# Patient Record
Sex: MALE | Race: WHITE | NOT HISPANIC OR LATINO | Employment: FULL TIME | URBAN - METROPOLITAN AREA
[De-identification: names, ages, dates, MRNs, and addresses within clinical notes are randomized per-mention and may not be internally consistent; named-entity substitution may affect disease eponyms.]

---

## 2023-06-05 ENCOUNTER — APPOINTMENT (OUTPATIENT)
Dept: GENERAL RADIOLOGY | Facility: HOSPITAL | Age: 53
End: 2023-06-05
Payer: OTHER MISCELLANEOUS

## 2023-06-05 ENCOUNTER — HOSPITAL ENCOUNTER (EMERGENCY)
Facility: HOSPITAL | Age: 53
Discharge: HOME | End: 2023-06-05
Attending: EMERGENCY MEDICINE | Admitting: EMERGENCY MEDICINE
Payer: OTHER MISCELLANEOUS

## 2023-06-05 VITALS
HEART RATE: 91 BPM | SYSTOLIC BLOOD PRESSURE: 137 MMHG | RESPIRATION RATE: 16 BRPM | TEMPERATURE: 98 F | OXYGEN SATURATION: 98 % | DIASTOLIC BLOOD PRESSURE: 84 MMHG

## 2023-06-05 DIAGNOSIS — S62.664A CLOSED NONDISPLACED FRACTURE OF DISTAL PHALANX OF RIGHT RING FINGER, INITIAL ENCOUNTER: Primary | ICD-10-CM

## 2023-06-05 DIAGNOSIS — S60.041A CONTUSION OF RIGHT RING FINGER WITHOUT DAMAGE TO NAIL, INITIAL ENCOUNTER: ICD-10-CM

## 2023-06-05 DIAGNOSIS — S61.219A SUPERFICIAL LACERATION OF FINGER: ICD-10-CM

## 2023-06-05 PROCEDURE — 90715 TDAP VACCINE 7 YRS/> IM: CPT | Performed by: EMERGENCY MEDICINE

## 2023-06-05 PROCEDURE — 99283 EMERGENCY DEPT VISIT LOW MDM: CPT

## 2023-06-05 PROCEDURE — 73130 X-RAY EXAM OF HAND: CPT

## 2023-06-05 PROCEDURE — 25010000002 TETANUS-DIPHTH-ACELL PERTUSSIS 5-2.5-18.5 LF-MCG/0.5 SUSPENSION PREFILLED SYRINGE: Performed by: EMERGENCY MEDICINE

## 2023-06-05 PROCEDURE — 63710000001 ONDANSETRON ODT 4 MG TABLET DISPERSIBLE: Performed by: EMERGENCY MEDICINE

## 2023-06-05 PROCEDURE — 90471 IMMUNIZATION ADMIN: CPT | Performed by: EMERGENCY MEDICINE

## 2023-06-05 RX ORDER — CEPHALEXIN 500 MG/1
500 CAPSULE ORAL ONCE
Status: COMPLETED | OUTPATIENT
Start: 2023-06-05 | End: 2023-06-05

## 2023-06-05 RX ORDER — CEPHALEXIN 500 MG/1
500 CAPSULE ORAL 3 TIMES DAILY
Qty: 15 CAPSULE | Refills: 0 | Status: SHIPPED | OUTPATIENT
Start: 2023-06-05

## 2023-06-05 RX ORDER — ONDANSETRON 4 MG/1
4 TABLET, ORALLY DISINTEGRATING ORAL ONCE
Status: COMPLETED | OUTPATIENT
Start: 2023-06-05 | End: 2023-06-05

## 2023-06-05 RX ORDER — TRAMADOL HYDROCHLORIDE 50 MG/1
50 TABLET ORAL EVERY 6 HOURS PRN
Qty: 10 TABLET | Refills: 0 | Status: SHIPPED | OUTPATIENT
Start: 2023-06-05

## 2023-06-05 RX ORDER — HYDROCODONE BITARTRATE AND ACETAMINOPHEN 7.5; 325 MG/1; MG/1
1 TABLET ORAL ONCE
Status: COMPLETED | OUTPATIENT
Start: 2023-06-05 | End: 2023-06-05

## 2023-06-05 RX ADMIN — TETANUS TOXOID, REDUCED DIPHTHERIA TOXOID AND ACELLULAR PERTUSSIS VACCINE, ADSORBED 0.5 ML: 5; 2.5; 8; 8; 2.5 SUSPENSION INTRAMUSCULAR at 09:57

## 2023-06-05 RX ADMIN — CEPHALEXIN 500 MG: 500 CAPSULE ORAL at 10:44

## 2023-06-05 RX ADMIN — HYDROCODONE BITARTRATE AND ACETAMINOPHEN 1 TABLET: 7.5; 325 TABLET ORAL at 10:03

## 2023-06-05 RX ADMIN — ONDANSETRON 4 MG: 4 TABLET, ORALLY DISINTEGRATING ORAL at 10:03

## 2023-06-05 NOTE — DISCHARGE INSTRUCTIONS
Follow-up with Dr. Brown (hand surgeon).  Wear splint on finger for the next 1 week.  Remove the bandage in 24 hours.  After that, clean the wounds with warm soapy water twice daily.  Cover wounds with Band-Aids.  Take medications as prescribed.  Return to the emergency department for fever, chills, finger redness, worsening pain, or other concern.

## 2023-06-05 NOTE — ED PROVIDER NOTES
EMERGENCY DEPARTMENT ENCOUNTER    Room Number:  12/12  Date seen:  6/5/2023  PCP: Provider, No Known  Historian: Patient, daughter (acting as )      HPI:  Chief Complaint: Right fourth and fifth finger injury  A complete HPI/ROS/PMH/PSH/SH/FH are unobtainable due to: Nothing  Context: Nicola Malloy is a 52 y.o. male who presents to the ED by private vehicle from work c/o right fourth and fifth finger injuries.  Patient was at work this morning when his fingers were smashed in a piece of equipment.  He reports tingling and decreased movement in the distal aspect of the right fourth and fifth fingers.  He is left-handed.  He is unsure of his tetanus status.  Denies other injuries.            PAST MEDICAL HISTORY  Active Ambulatory Problems     Diagnosis Date Noted    No Active Ambulatory Problems     Resolved Ambulatory Problems     Diagnosis Date Noted    No Resolved Ambulatory Problems     No Additional Past Medical History         PAST SURGICAL HISTORY  History reviewed. No pertinent surgical history.      FAMILY HISTORY  History reviewed. No pertinent family history.      SOCIAL HISTORY  Social History     Socioeconomic History    Marital status: Single         ALLERGIES  Patient has no known allergies.        REVIEW OF SYSTEMS  Review of Systems     All systems have been reviewed and are negative except as as discussed in the HPI    PHYSICAL EXAM  ED Triage Vitals   Temp Heart Rate Resp BP SpO2   06/05/23 0903 06/05/23 0903 06/05/23 0903 06/05/23 0935 06/05/23 0903   98 °F (36.7 °C) 87 18 137/84 100 %      Temp src Heart Rate Source Patient Position BP Location FiO2 (%)   06/05/23 0903 06/05/23 0903 -- -- --   Tympanic Monitor          Physical Exam      GENERAL: Awake and alert.  Well-developed, well-nourished male.  Resting comfortably in no acute distress  HENT: NCAT, nares patent  EYES: no scleral icterus  CV: regular rhythm, normal rate, normal right radial pulse  RESPIRATORY: normal effort,  clear to auscultation bilaterally  ABDOMEN: Soft, nontender  MUSCULOSKELETAL: There is tenderness, swelling, and bruising of the distal aspect of the right fourth finger.  There is minimal tenderness at the tip of the right fifth finger.  There is a small proximal subungual hematoma of the right fourth finger.  There is a superficial laceration on the palmar and distal aspect of the right fourth finger.  Laceration is less than 0.5 cm in length.  There is a superficial abrasion at the base of the nail of the right fourth finger.  Nail is intact and without laxity.  There is full range of motion of the left fourth and fifth MCP, PIP, and DIP joints.  NEURO: Normal strength and light touch sensation in the right upper extremity  PSYCH:  calm, cooperative  SKIN: warm, dry    Vital signs and nursing notes reviewed.          LAB RESULTS  No results found for this or any previous visit (from the past 24 hour(s)).    Ordered the above labs and reviewed the results.        RADIOLOGY  XR Hand 3+ View Right    Result Date: 6/5/2023  XR HAND 3+ VW RIGHT-  INDICATIONS: Trauma  TECHNIQUE: 4 views of the right hand  COMPARISON: None available  FINDINGS:  A fracture is seen at the tip of the fourth distal phalanx with adjacent soft tissue swelling. No other evidence of fracture is identified. No dislocation.       Fracture of the fourth distal phalanx.  This report was finalized on 6/5/2023 10:07 AM by Dr. Hi Vasquez M.D.       Ordered the above noted radiological studies. Reviewed by me in PACS.            PROCEDURES  Procedures              MEDICATIONS GIVEN IN ER  Medications   Tetanus-Diphth-Acell Pertussis (BOOSTRIX) injection 0.5 mL (0.5 mL Intramuscular Given 6/5/23 0957)   HYDROcodone-acetaminophen (NORCO) 7.5-325 MG per tablet 1 tablet (1 tablet Oral Given 6/5/23 1003)   ondansetron ODT (ZOFRAN-ODT) disintegrating tablet 4 mg (4 mg Oral Given 6/5/23 1003)   cephalexin (KEFLEX) capsule 500 mg (500 mg Oral Given  6/5/23 1044)                   MEDICAL DECISION MAKING, PROGRESS, and CONSULTS    All labs have been independently reviewed by me.  All radiology studies have been reviewed by me and I have also reviewed the radiology report.   EKG's independently viewed and interpreted by me.  Discussion below represents my analysis of pertinent findings related to patient's condition, differential diagnosis, treatment plan and final disposition.      Additional sources:  - Discussed/ obtained information from independent historians: Daughter at bedside    - External (non-ED) record review: There are no prior records in the EMR    - Chronic or social conditions impacting care: N/A          Orders placed during this visit:  Orders Placed This Encounter   Procedures    XR Hand 3+ View Right         Additional orders considered but not ordered:  N/A        Differential diagnosis:    Finger fracture/dislocation, finger contusion, laceration, nail injury      Independent interpretation of labs, radiology studies, and discussions with consultants:  ED Course as of 06/05/23 1512   Mon Jun 05, 2023   0959 Hand x-ray personally interpreted by me.  My personal interpretation is: There is a fracture of the tip of the distal fourth phalanx.  No dislocation.  No foreign bodies. [WH]   1033 X-rays were shown to the patient and his daughter.  Results were discussed with him.  Patient's finger has been cleaned.  The right fourth finger will be placed in a dressing and splint.  He will be discharged with prescriptions for short course of Keflex and pain medication.  He will be referred to hand surgery for follow-up. [WH]   1040 TEOFILO query complete. Treatment plan to include limited course of prescribed  controlled substance. Risks including addiction, benefits, and alternatives presented to patient.    [WH]      ED Course User Index  [WH] Gurjit Blake MD               DIAGNOSIS  Final diagnoses:   Closed nondisplaced fracture of distal  phalanx of right ring finger, initial encounter   Superficial laceration of finger   Contusion of right ring finger without damage to nail, initial encounter         DISPOSITION  DISCHARGE    Patient discharged in stable condition.    Reviewed implications of results, diagnosis, meds, responsibility to follow up, warning signs and symptoms of possible worsening, potential complications and reasons to return to ER, including worsening pain, drainage from wound, fever, or other concern..    Patient/Family voiced understanding of above instructions.    Discussed plan for discharge, as there is no emergent indication for admission. Patient referred to primary care provider for BP management due to today's BP. Pt/family is agreeable and understands need for follow up and repeat testing.  Pt is aware that discharge does not mean that nothing is wrong but it indicates no emergency is present that requires admission and they must continue care with follow-up as given below or physician of their choice.     FOLLOW-UP  Franco Brown MD  [address] [facility] [address]  [place] [id number] [phone]    Schedule an appointment as soon as possible for a visit   Fracture of distal phalanx of the right fourth finger         Medication List        New Prescriptions      cephalexin 500 MG capsule  Commonly known as: KEFLEX  Take 1 capsule by mouth 3 (Three) Times a Day.     traMADol 50 MG tablet  Commonly known as: ULTRAM  Take 1 tablet by mouth Every 6 (Six) Hours As Needed for Moderate Pain.               Where to Get Your Medications        These medications were sent to [facility] - [place], [place] - [address] - [phone]  - [phone] [address], [facility] [address]      Phone: [phone]   cephalexin 500 MG capsule  traMADol 50 MG tablet                   Latest Documented Vital Signs:  As of 15:12 EDT  BP- 137/84 HR- 91 Temp- 98 °F (36.7 °C) (Tympanic) O2 sat- 98%      TEOFILO reviewed by  Gurjit Blake MD       --    Please note that portions of this were completed with a voice recognition program.       Note Disclaimer: At [facility], we believe that sharing information builds trust and better relationships. You are receiving this note because you are receiving care at [facility] or recently visited. It is possible you will see health information before a provider has talked with you about it. This kind of information can be easy to misunderstand. To help you fully understand what it means for your health, we urge you to discuss this note with your provider.             Gurjit Blake MD  06/05/23 7440

## 2023-06-05 NOTE — Clinical Note
[facility] EMERGENCY DEPARTMENT  [address] [place] [address]  Phone: [phone]    Nicola Malloy was seen and treated in our emergency department on 6/5/2023.  He may return to work on 06/08/2023.         Thank you for choosing [facility].    Ana Fontanez RN

## 2023-06-05 NOTE — ED NOTES
Pt to ED with injury at work, family interpreting for pt, states he had RH 4th and 5th fingers crushed in a machine at work. Mild active bleeding unknown when last TDap was. Cannot move fingers at knuckle, pain also repesent to palm just above knuckle.